# Patient Record
Sex: FEMALE | Race: WHITE | NOT HISPANIC OR LATINO | Employment: OTHER | URBAN - METROPOLITAN AREA
[De-identification: names, ages, dates, MRNs, and addresses within clinical notes are randomized per-mention and may not be internally consistent; named-entity substitution may affect disease eponyms.]

---

## 2023-06-13 ENCOUNTER — OFFICE VISIT (OUTPATIENT)
Dept: OBSTETRICS AND GYNECOLOGY | Facility: CLINIC | Age: 63
End: 2023-06-13
Payer: MEDICARE

## 2023-06-13 ENCOUNTER — TELEPHONE (OUTPATIENT)
Dept: OBSTETRICS AND GYNECOLOGY | Facility: CLINIC | Age: 63
End: 2023-06-13

## 2023-06-13 VITALS
HEIGHT: 64 IN | BODY MASS INDEX: 33.46 KG/M2 | WEIGHT: 196 LBS | HEART RATE: 75 BPM | DIASTOLIC BLOOD PRESSURE: 94 MMHG | SYSTOLIC BLOOD PRESSURE: 168 MMHG

## 2023-06-13 DIAGNOSIS — L73.9 FOLLICULITIS: Primary | ICD-10-CM

## 2023-06-13 DIAGNOSIS — L98.9 DERMATOLOGIC PROBLEM: ICD-10-CM

## 2023-06-13 PROCEDURE — 4010F PR ACE/ARB THEARPY RXD/TAKEN: ICD-10-PCS | Mod: CPTII,S$GLB,, | Performed by: OBSTETRICS & GYNECOLOGY

## 2023-06-13 PROCEDURE — 99213 PR OFFICE/OUTPT VISIT, EST, LEVL III, 20-29 MIN: ICD-10-PCS | Mod: S$GLB,,, | Performed by: OBSTETRICS & GYNECOLOGY

## 2023-06-13 PROCEDURE — 4010F ACE/ARB THERAPY RXD/TAKEN: CPT | Mod: CPTII,S$GLB,, | Performed by: OBSTETRICS & GYNECOLOGY

## 2023-06-13 PROCEDURE — 99213 OFFICE O/P EST LOW 20 MIN: CPT | Mod: S$GLB,,, | Performed by: OBSTETRICS & GYNECOLOGY

## 2023-06-13 RX ORDER — ALPRAZOLAM 0.5 MG/1
0.5 TABLET ORAL
COMMUNITY
Start: 2023-01-27

## 2023-06-13 RX ORDER — LOSARTAN POTASSIUM 100 MG/1
100 TABLET ORAL
COMMUNITY
Start: 2023-04-10

## 2023-06-13 RX ORDER — MUPIROCIN 20 MG/G
OINTMENT TOPICAL 3 TIMES DAILY
Qty: 1 EACH | Refills: 4 | Status: SHIPPED | OUTPATIENT
Start: 2023-06-13

## 2023-06-13 RX ORDER — NEBIVOLOL 20 MG/1
1 TABLET ORAL
COMMUNITY
Start: 2023-04-28

## 2023-06-13 RX ORDER — NYSTATIN AND TRIAMCINOLONE ACETONIDE 100000; 1 [USP'U]/G; MG/G
CREAM TOPICAL
Qty: 30 G | Refills: 5 | Status: SHIPPED | OUTPATIENT
Start: 2023-06-13 | End: 2024-01-29

## 2023-06-13 NOTE — TELEPHONE ENCOUNTER
----- Message from Arelis Miller sent at 6/13/2023 11:34 AM CDT -----  Contact: self  Type: Staff Message  Caller: Jannie Corley  Call Back Number: [phone]  Nature of the Call: Patient lives out of town and she is currently at [facility] pharmacy to  her medication-mupirocin (BACTROBAN) 2 % ointment 1 each 4 6/13/2023   Sig: Apply topically 3 (three) times daily.  Class: Print  Route: Topical (Top)  Pharmacist states there is no medicine prescription sent.  Additional Information: n/a

## 2023-06-13 NOTE — TELEPHONE ENCOUNTER
----- Message from Arelis Miller sent at 6/13/2023 11:34 AM CDT -----  Contact: self  Type: Staff Message  Caller: Jannie Corley  Call Back Number: [phone]  Nature of the Call: Patient lives out of town and she is currently at [facility] pharmacy to  her medication-mupirocin (BACTROBAN) 2 % ointment 1 each 4 6/13/2023   Sig: Apply topically 3 (three) times daily.  Class: Print  Route: Topical (Top)  Pharmacist states there is no medicine prescription sent.  Additional Information: n/a

## 2023-06-13 NOTE — PROGRESS NOTES
A 62-year-old female who is a borderline diabetic complains about twice a year getting a red rash with irritation underneath her pannus her in both axilla and under her breast which will put her on some Mycolog for she also has a hair follicle irritation recurs in her suprapubic area and hairline therefore mucin frozen does help this she has no other complaints vaginal exam is completely normal

## 2023-06-13 NOTE — PROGRESS NOTES
Subjective     Patient ID: Jannie Corley is a 62 y.o. female.    Chief Complaint: Vaginal Pain    Vaginal Pain  The patient's pertinent negatives include no pelvic pain or vaginal discharge. Pertinent negatives include no abdominal pain, back pain, chills, constipation, diarrhea, dysuria, fever, flank pain, frequency, headaches, hematuria, nausea, rash, urgency or vomiting.   Review of Systems   Constitutional:  Negative for activity change, appetite change, chills, fever and unexpected weight change.   HENT:  Negative for nasal congestion, dental problem, facial swelling, hearing loss and mouth sores.    Respiratory:  Negative for apnea, chest tightness, shortness of breath and wheezing.    Cardiovascular:  Negative for chest pain and leg swelling.   Gastrointestinal:  Negative for abdominal distention, abdominal pain, anal bleeding, blood in stool, constipation, diarrhea, nausea, rectal pain and vomiting.   Genitourinary:  Positive for vaginal pain. Negative for decreased urine volume, difficulty urinating, dyspareunia, dysuria, enuresis, flank pain, frequency, genital sores, hematuria, menstrual problem, pelvic pain, urgency, vaginal bleeding and vaginal discharge.   Musculoskeletal:  Negative for arthralgias, back pain, joint swelling, myalgias and neck pain.   Integumentary:  Negative for color change, pallor, rash and wound.   Allergic/Immunologic: Negative for immunocompromised state.   Neurological:  Negative for dizziness, light-headedness and headaches.   Hematological:  Negative for adenopathy. Does not bruise/bleed easily.   Psychiatric/Behavioral:  Negative for agitation, behavioral problems, confusion, sleep disturbance and suicidal ideas. The patient is not nervous/anxious and is not hyperactive.         Objective     Physical Exam       Assessment and Plan                  No follow-ups on file.

## 2024-01-29 ENCOUNTER — OFFICE VISIT (OUTPATIENT)
Dept: OBSTETRICS AND GYNECOLOGY | Facility: CLINIC | Age: 64
End: 2024-01-29
Payer: MEDICARE

## 2024-01-29 VITALS
WEIGHT: 190 LBS | HEART RATE: 89 BPM | DIASTOLIC BLOOD PRESSURE: 116 MMHG | BODY MASS INDEX: 32.61 KG/M2 | SYSTOLIC BLOOD PRESSURE: 174 MMHG

## 2024-01-29 DIAGNOSIS — N76.2 ACUTE VULVITIS: Primary | ICD-10-CM

## 2024-01-29 PROCEDURE — 3077F SYST BP >= 140 MM HG: CPT | Mod: CPTII,S$GLB,, | Performed by: OBSTETRICS & GYNECOLOGY

## 2024-01-29 PROCEDURE — 99213 OFFICE O/P EST LOW 20 MIN: CPT | Mod: S$GLB,,, | Performed by: OBSTETRICS & GYNECOLOGY

## 2024-01-29 PROCEDURE — 3008F BODY MASS INDEX DOCD: CPT | Mod: CPTII,S$GLB,, | Performed by: OBSTETRICS & GYNECOLOGY

## 2024-01-29 PROCEDURE — 1159F MED LIST DOCD IN RCRD: CPT | Mod: CPTII,S$GLB,, | Performed by: OBSTETRICS & GYNECOLOGY

## 2024-01-29 PROCEDURE — 4010F ACE/ARB THERAPY RXD/TAKEN: CPT | Mod: CPTII,S$GLB,, | Performed by: OBSTETRICS & GYNECOLOGY

## 2024-01-29 PROCEDURE — 3080F DIAST BP >= 90 MM HG: CPT | Mod: CPTII,S$GLB,, | Performed by: OBSTETRICS & GYNECOLOGY

## 2024-01-29 RX ORDER — NYSTATIN AND TRIAMCINOLONE ACETONIDE 100000; 1 [USP'U]/G; MG/G
CREAM TOPICAL
Qty: 30 G | Refills: 1 | Status: SHIPPED | OUTPATIENT
Start: 2024-01-29 | End: 2025-01-28

## 2024-01-29 NOTE — PROGRESS NOTES
Subjective:      Patient ID: Jannie Corley is a 63 y.o. female.    Chief Complaint:  Vulvar Itch      History of Present Illness  S a 63-year-old female with a history having little ball near her rectum which drained then had an upper respiratory infection for a month she was given 2 rounds of a Z-Jesus she developed some vulvar irritation wear of the urine even he would be very tender not having any kind of discharge          GYN & OB History  No LMP recorded (lmp unknown). Patient is postmenopausal.   Date of Last Pap: No result found    OB History    Para Term  AB Living   2         2   SAB IAB Ectopic Multiple Live Births                  # Outcome Date GA Lbr Denys/2nd Weight Sex Delivery Anes PTL Lv   2       Vag-Spont      1       Vag-Spont          Review of Systems  Review of Systems   Constitutional:  Negative for chills and fever.   Respiratory:  Negative for shortness of breath.    Cardiovascular:  Negative for chest pain.   Gastrointestinal:  Negative for abdominal pain, blood in stool, constipation, diarrhea, nausea, vomiting and reflux.   Genitourinary:  Positive for pelvic pain. Negative for dysmenorrhea, dyspareunia, dysuria, hematuria, hot flashes, menorrhagia, menstrual problem, vaginal bleeding, vaginal discharge, postcoital bleeding and vaginal dryness.   Musculoskeletal:  Negative for arthralgias and joint swelling.   Integumentary:  Negative for rash, hair changes, breast mass, nipple discharge and breast skin changes.   Psychiatric/Behavioral:  Negative for depression. The patient is not nervous/anxious.    Breast: Negative for asymmetry, lump, mass, nipple discharge and skin changes         Objective:     Physical Exam:               Genitourinary:    Genitourinary Comments: The vulva is fairly erythematous has a posterior fourchette vagina is normal appearing discharge no lesions nose any yeast bimanual is normal her urinalysis is negative                          Assessment:     1. Acute vulvitis     2 diabetes  3 bronchitis       Plan:     Mycolog and affirm culture

## 2024-02-01 ENCOUNTER — TELEPHONE (OUTPATIENT)
Dept: OBSTETRICS AND GYNECOLOGY | Facility: CLINIC | Age: 64
End: 2024-02-01
Payer: MEDICARE

## 2024-02-01 NOTE — TELEPHONE ENCOUNTER
----- Message from Silvia John sent at 2/1/2024  1:20 PM CST -----  Name of Who is Calling:pt           What is the request in detail:requesting a call back to discuss results           Can the clinic reply by [facility]:no           What Number to Call Back if not in [facility]: [phone]

## 2024-02-05 ENCOUNTER — TELEPHONE (OUTPATIENT)
Dept: OBSTETRICS AND GYNECOLOGY | Facility: CLINIC | Age: 64
End: 2024-02-05
Payer: MEDICARE

## 2024-02-05 NOTE — TELEPHONE ENCOUNTER
----- Message from Virginia Todd sent at 2/5/2024  9:47 AM CST -----  Contact: self  Type:  Test Results    Who Called: Jannie Corley  Name of Test (Lab/Mammo/Etc): pap  Date of Test: 01/2024  Ordering Provider: abhijit  Where the test was performed: office  Would the patient rather a call back or a response via [facility]?   Best Call Back Number: [phone]  Additional Information:  n/a

## 2024-02-05 NOTE — TELEPHONE ENCOUNTER
Called pt, discussed positive affirm, advised to use Monistat, call back if symptoms unrelieved, pt v/u.

## 2025-02-26 ENCOUNTER — TELEPHONE (OUTPATIENT)
Dept: OBSTETRICS AND GYNECOLOGY | Facility: CLINIC | Age: 65
End: 2025-02-26
Payer: MEDICARE

## 2025-02-26 NOTE — TELEPHONE ENCOUNTER
----- Message from Audelia sent at 2/26/2025  9:12 AM CST -----  Regarding: rx refil  Contact: Jannie  Type:  RX Refill RequestWho Called: Jannie Refill or New Rx: refilll RX Name and Strength:mupirocin (BACTROBAN) 2 % ointmHow is the patient currently taking it? (ex. 1XDay): Apply topically 3 (three) times daily. - Topical (Top)Is this a 30 day or 90 day RX:Preferred Pharmacy with phone number: [phone] [id number]: [phone] Fax: [phone] Local or Mail Order: local Ordering Provider: WICHO Acuna Would the patient rather a call back or a response via [facility]? call Best Call Back Number: [phone] (home)  Additional Information:  Please call when completed

## 2025-02-27 RX ORDER — MUPIROCIN 20 MG/G
OINTMENT TOPICAL 3 TIMES DAILY
Qty: 1 EACH | Refills: 4 | Status: SHIPPED | OUTPATIENT
Start: 2025-02-27